# Patient Record
Sex: FEMALE | Race: WHITE | NOT HISPANIC OR LATINO | Employment: FULL TIME | URBAN - NONMETROPOLITAN AREA
[De-identification: names, ages, dates, MRNs, and addresses within clinical notes are randomized per-mention and may not be internally consistent; named-entity substitution may affect disease eponyms.]

---

## 2017-11-14 ENCOUNTER — TRANSCRIBE ORDERS (OUTPATIENT)
Dept: ADMINISTRATIVE | Facility: HOSPITAL | Age: 57
End: 2017-11-14

## 2017-11-14 DIAGNOSIS — R07.89 CHEST PAIN, ATYPICAL: Primary | ICD-10-CM

## 2018-06-11 ENCOUNTER — APPOINTMENT (OUTPATIENT)
Dept: LAB | Facility: HOSPITAL | Age: 58
End: 2018-06-11
Attending: PLASTIC SURGERY

## 2018-06-11 ENCOUNTER — TRANSCRIBE ORDERS (OUTPATIENT)
Dept: ADMINISTRATIVE | Facility: HOSPITAL | Age: 58
End: 2018-06-11

## 2018-06-11 DIAGNOSIS — Z41.1 ENCOUNTER FOR COSMETIC SURGERY: Primary | ICD-10-CM

## 2018-06-11 LAB
ANION GAP SERPL CALCULATED.3IONS-SCNC: 8 MMOL/L (ref 4–13)
BUN BLD-MCNC: 12 MG/DL (ref 5–21)
BUN/CREAT SERPL: 17.1 (ref 7–25)
CALCIUM SPEC-SCNC: 9 MG/DL (ref 8.4–10.4)
CHLORIDE SERPL-SCNC: 101 MMOL/L (ref 98–110)
CO2 SERPL-SCNC: 30 MMOL/L (ref 24–31)
CREAT BLD-MCNC: 0.7 MG/DL (ref 0.5–1.4)
DEPRECATED RDW RBC AUTO: 39.2 FL (ref 40–54)
ERYTHROCYTE [DISTWIDTH] IN BLOOD BY AUTOMATED COUNT: 13 % (ref 12–15)
GFR SERPL CREATININE-BSD FRML MDRD: 86 ML/MIN/1.73
GLUCOSE BLD-MCNC: 116 MG/DL (ref 70–100)
HCT VFR BLD AUTO: 36.1 % (ref 37–47)
HGB BLD-MCNC: 11.9 G/DL (ref 12–16)
MCH RBC QN AUTO: 27.7 PG (ref 28–32)
MCHC RBC AUTO-ENTMCNC: 33 G/DL (ref 33–36)
MCV RBC AUTO: 84 FL (ref 82–98)
PLATELET # BLD AUTO: 181 10*3/MM3 (ref 130–400)
PMV BLD AUTO: 12.2 FL (ref 6–12)
POTASSIUM BLD-SCNC: 4.3 MMOL/L (ref 3.5–5.3)
RBC # BLD AUTO: 4.3 10*6/MM3 (ref 4.2–5.4)
SODIUM BLD-SCNC: 139 MMOL/L (ref 135–145)
WBC NRBC COR # BLD: 7.06 10*3/MM3 (ref 4.8–10.8)

## 2018-06-11 PROCEDURE — 36415 COLL VENOUS BLD VENIPUNCTURE: CPT | Performed by: PLASTIC SURGERY

## 2018-06-11 PROCEDURE — 80048 BASIC METABOLIC PNL TOTAL CA: CPT | Performed by: PLASTIC SURGERY

## 2018-06-11 PROCEDURE — 85027 COMPLETE CBC AUTOMATED: CPT | Performed by: PLASTIC SURGERY

## 2020-03-20 ENCOUNTER — TRANSCRIBE ORDERS (OUTPATIENT)
Dept: ADMINISTRATIVE | Facility: HOSPITAL | Age: 60
End: 2020-03-20

## 2020-03-20 DIAGNOSIS — Z12.31 ENCOUNTER FOR SCREENING MAMMOGRAM FOR MALIGNANT NEOPLASM OF BREAST: Primary | ICD-10-CM

## 2020-07-06 ENCOUNTER — TRANSCRIBE ORDERS (OUTPATIENT)
Dept: ADMINISTRATIVE | Facility: HOSPITAL | Age: 60
End: 2020-07-06

## 2020-07-06 DIAGNOSIS — Z12.31 ENCOUNTER FOR SCREENING MAMMOGRAM FOR MALIGNANT NEOPLASM OF BREAST: Primary | ICD-10-CM

## 2020-07-06 DIAGNOSIS — Z78.0 POSTMENOPAUSAL STATUS: ICD-10-CM

## 2020-07-28 ENCOUNTER — HOSPITAL ENCOUNTER (OUTPATIENT)
Dept: MAMMOGRAPHY | Facility: HOSPITAL | Age: 60
Discharge: HOME | End: 2020-07-28
Admitting: PHYSICIAN ASSISTANT

## 2020-07-28 ENCOUNTER — HOSPITAL ENCOUNTER (OUTPATIENT)
Dept: BONE DENSITY | Facility: HOSPITAL | Age: 60
Discharge: HOME | End: 2020-07-28

## 2020-07-28 DIAGNOSIS — Z78.0 POSTMENOPAUSAL STATUS: ICD-10-CM

## 2020-07-28 DIAGNOSIS — Z12.31 ENCOUNTER FOR SCREENING MAMMOGRAM FOR MALIGNANT NEOPLASM OF BREAST: ICD-10-CM

## 2020-07-28 PROCEDURE — 77063 BREAST TOMOSYNTHESIS BI: CPT

## 2020-07-28 PROCEDURE — 77080 DXA BONE DENSITY AXIAL: CPT

## 2020-07-28 PROCEDURE — 77067 SCR MAMMO BI INCL CAD: CPT

## 2020-11-03 ENCOUNTER — TRANSCRIBE ORDERS (OUTPATIENT)
Dept: ADMINISTRATIVE | Facility: HOSPITAL | Age: 60
End: 2020-11-03

## 2020-11-03 DIAGNOSIS — J32.0 CHRONIC SINUSITIS OF BOTH MAXILLARY SINUSES: Primary | ICD-10-CM

## 2020-11-17 ENCOUNTER — HOSPITAL ENCOUNTER (OUTPATIENT)
Dept: CT IMAGING | Facility: HOSPITAL | Age: 60
Discharge: HOME | End: 2020-11-17
Admitting: FAMILY MEDICINE

## 2020-11-17 DIAGNOSIS — J32.0 CHRONIC SINUSITIS OF BOTH MAXILLARY SINUSES: ICD-10-CM

## 2020-11-17 PROCEDURE — 70486 CT MAXILLOFACIAL W/O DYE: CPT

## 2021-05-18 ENCOUNTER — TRANSCRIBE ORDERS (OUTPATIENT)
Dept: ADMINISTRATIVE | Facility: HOSPITAL | Age: 61
End: 2021-05-18

## 2021-05-18 DIAGNOSIS — Z12.31 ENCOUNTER FOR SCREENING MAMMOGRAM FOR MALIGNANT NEOPLASM OF BREAST: Primary | ICD-10-CM

## 2021-07-29 ENCOUNTER — HOSPITAL ENCOUNTER (OUTPATIENT)
Dept: MAMMOGRAPHY | Facility: HOSPITAL | Age: 61
Discharge: HOME | End: 2021-07-29
Admitting: PHYSICIAN ASSISTANT

## 2021-07-29 DIAGNOSIS — Z12.31 ENCOUNTER FOR SCREENING MAMMOGRAM FOR MALIGNANT NEOPLASM OF BREAST: ICD-10-CM

## 2021-07-29 PROCEDURE — 77067 SCR MAMMO BI INCL CAD: CPT

## 2021-07-29 PROCEDURE — 77063 BREAST TOMOSYNTHESIS BI: CPT

## 2021-10-11 ENCOUNTER — TRANSCRIBE ORDERS (OUTPATIENT)
Dept: ADMINISTRATIVE | Facility: HOSPITAL | Age: 61
End: 2021-10-11

## 2021-10-11 DIAGNOSIS — R00.2 PALPITATIONS: Primary | ICD-10-CM

## 2021-10-13 ENCOUNTER — HOSPITAL ENCOUNTER (OUTPATIENT)
Dept: CARDIOLOGY | Facility: HOSPITAL | Age: 61
Discharge: HOME | End: 2021-10-13
Admitting: PHYSICIAN ASSISTANT

## 2021-10-13 DIAGNOSIS — R00.2 PALPITATIONS: ICD-10-CM

## 2021-10-13 PROCEDURE — 93246 EXT ECG>7D<15D RECORDING: CPT

## 2021-11-05 LAB
MAXIMAL PREDICTED HEART RATE: 159 BPM
STRESS TARGET HR: 135 BPM

## 2021-11-05 PROCEDURE — 93248 EXT ECG>7D<15D REV&INTERPJ: CPT | Performed by: INTERNAL MEDICINE

## 2021-11-22 ENCOUNTER — HOSPITAL ENCOUNTER (OUTPATIENT)
Dept: CARDIOLOGY | Facility: HOSPITAL | Age: 61
Discharge: HOME | End: 2021-11-22
Admitting: PHYSICIAN ASSISTANT

## 2021-11-22 VITALS
DIASTOLIC BLOOD PRESSURE: 70 MMHG | HEIGHT: 66 IN | WEIGHT: 140 LBS | SYSTOLIC BLOOD PRESSURE: 128 MMHG | BODY MASS INDEX: 22.5 KG/M2

## 2021-11-22 DIAGNOSIS — R00.2 PALPITATIONS: ICD-10-CM

## 2021-11-22 LAB
BH CV ECHO MEAS - AO MAX PG (FULL): 1.4 MMHG
BH CV ECHO MEAS - AO MAX PG: 5.3 MMHG
BH CV ECHO MEAS - AO MEAN PG (FULL): 1 MMHG
BH CV ECHO MEAS - AO MEAN PG: 3 MMHG
BH CV ECHO MEAS - AO ROOT AREA (BSA CORRECTED): 1.7
BH CV ECHO MEAS - AO ROOT AREA: 7.1 CM^2
BH CV ECHO MEAS - AO ROOT DIAM: 3 CM
BH CV ECHO MEAS - AO V2 MAX: 115 CM/SEC
BH CV ECHO MEAS - AO V2 MEAN: 78.9 CM/SEC
BH CV ECHO MEAS - AO V2 VTI: 28.5 CM
BH CV ECHO MEAS - AVA(I,A): 2.6 CM^2
BH CV ECHO MEAS - AVA(I,D): 2.6 CM^2
BH CV ECHO MEAS - AVA(V,A): 2.7 CM^2
BH CV ECHO MEAS - AVA(V,D): 2.7 CM^2
BH CV ECHO MEAS - BSA(HAYCOCK): 1.7 M^2
BH CV ECHO MEAS - BSA: 1.7 M^2
BH CV ECHO MEAS - BZI_BMI: 22.6 KILOGRAMS/M^2
BH CV ECHO MEAS - BZI_METRIC_HEIGHT: 167.6 CM
BH CV ECHO MEAS - BZI_METRIC_WEIGHT: 63.5 KG
BH CV ECHO MEAS - EDV(CUBED): 65.9 ML
BH CV ECHO MEAS - EDV(MOD-SP4): 70.6 ML
BH CV ECHO MEAS - EDV(TEICH): 71.7 ML
BH CV ECHO MEAS - EF(CUBED): 67.8 %
BH CV ECHO MEAS - EF(MOD-SP4): 63.3 %
BH CV ECHO MEAS - EF(TEICH): 59.8 %
BH CV ECHO MEAS - ESV(CUBED): 21.3 ML
BH CV ECHO MEAS - ESV(MOD-SP4): 25.9 ML
BH CV ECHO MEAS - ESV(TEICH): 28.8 ML
BH CV ECHO MEAS - FS: 31.4 %
BH CV ECHO MEAS - IVS/LVPW: 0.89
BH CV ECHO MEAS - IVSD: 0.79 CM
BH CV ECHO MEAS - LA DIMENSION: 2.8 CM
BH CV ECHO MEAS - LA/AO: 0.93
BH CV ECHO MEAS - LAT PEAK E' VEL: 10.4 CM/SEC
BH CV ECHO MEAS - LV DIASTOLIC VOL/BSA (35-75): 41.1 ML/M^2
BH CV ECHO MEAS - LV MASS(C)D: 101.5 GRAMS
BH CV ECHO MEAS - LV MASS(C)DI: 59.1 GRAMS/M^2
BH CV ECHO MEAS - LV MAX PG: 3.9 MMHG
BH CV ECHO MEAS - LV MEAN PG: 2 MMHG
BH CV ECHO MEAS - LV SYSTOLIC VOL/BSA (12-30): 15.1 ML/M^2
BH CV ECHO MEAS - LV V1 MAX: 99 CM/SEC
BH CV ECHO MEAS - LV V1 MEAN: 67.3 CM/SEC
BH CV ECHO MEAS - LV V1 VTI: 23.3 CM
BH CV ECHO MEAS - LVIDD: 4 CM
BH CV ECHO MEAS - LVIDS: 2.8 CM
BH CV ECHO MEAS - LVLD AP4: 7 CM
BH CV ECHO MEAS - LVLS AP4: 5.3 CM
BH CV ECHO MEAS - LVOT AREA (M): 3.1 CM^2
BH CV ECHO MEAS - LVOT AREA: 3.1 CM^2
BH CV ECHO MEAS - LVOT DIAM: 2 CM
BH CV ECHO MEAS - LVPWD: 0.89 CM
BH CV ECHO MEAS - MED PEAK E' VEL: 9.79 CM/SEC
BH CV ECHO MEAS - MV A MAX VEL: 51.4 CM/SEC
BH CV ECHO MEAS - MV DEC TIME: 0.34 SEC
BH CV ECHO MEAS - MV E MAX VEL: 82.5 CM/SEC
BH CV ECHO MEAS - MV E/A: 1.6
BH CV ECHO MEAS - PA MAX PG: 1.1 MMHG
BH CV ECHO MEAS - PA V2 MAX: 52.4 CM/SEC
BH CV ECHO MEAS - RAP SYSTOLE: 5 MMHG
BH CV ECHO MEAS - RVSP: 31 MMHG
BH CV ECHO MEAS - SI(AO): 117.2 ML/M^2
BH CV ECHO MEAS - SI(CUBED): 26 ML/M^2
BH CV ECHO MEAS - SI(LVOT): 42.6 ML/M^2
BH CV ECHO MEAS - SI(MOD-SP4): 26 ML/M^2
BH CV ECHO MEAS - SI(TEICH): 25 ML/M^2
BH CV ECHO MEAS - SV(AO): 201.5 ML
BH CV ECHO MEAS - SV(CUBED): 44.7 ML
BH CV ECHO MEAS - SV(LVOT): 73.2 ML
BH CV ECHO MEAS - SV(MOD-SP4): 44.7 ML
BH CV ECHO MEAS - SV(TEICH): 42.9 ML
BH CV ECHO MEAS - TR MAX VEL: 254 CM/SEC
BH CV ECHO MEASUREMENTS AVERAGE E/E' RATIO: 8.17
LEFT ATRIUM VOLUME INDEX: 24.6 ML/M2
LEFT ATRIUM VOLUME: 42.3 CM3
MAXIMAL PREDICTED HEART RATE: 159 BPM
STRESS TARGET HR: 135 BPM

## 2021-11-22 PROCEDURE — 25010000002 PERFLUTREN 6.52 MG/ML SUSPENSION: Performed by: PHYSICIAN ASSISTANT

## 2021-11-22 PROCEDURE — 93306 TTE W/DOPPLER COMPLETE: CPT | Performed by: INTERNAL MEDICINE

## 2021-11-22 PROCEDURE — 93306 TTE W/DOPPLER COMPLETE: CPT

## 2021-11-22 RX ADMIN — PERFLUTREN 9.78 MG: 6.52 INJECTION, SUSPENSION INTRAVENOUS at 07:48

## 2021-11-24 ENCOUNTER — OFFICE VISIT (OUTPATIENT)
Dept: CARDIOLOGY | Facility: CLINIC | Age: 61
End: 2021-11-24

## 2021-11-24 VITALS
HEIGHT: 66 IN | OXYGEN SATURATION: 98 % | WEIGHT: 145 LBS | BODY MASS INDEX: 23.3 KG/M2 | DIASTOLIC BLOOD PRESSURE: 64 MMHG | SYSTOLIC BLOOD PRESSURE: 109 MMHG | HEART RATE: 56 BPM

## 2021-11-24 DIAGNOSIS — I49.3 PVC (PREMATURE VENTRICULAR CONTRACTION): ICD-10-CM

## 2021-11-24 DIAGNOSIS — I47.1 PSVT (PAROXYSMAL SUPRAVENTRICULAR TACHYCARDIA) (HCC): ICD-10-CM

## 2021-11-24 DIAGNOSIS — Z71.89 CARDIAC RISK COUNSELING: ICD-10-CM

## 2021-11-24 DIAGNOSIS — R00.2 PALPITATIONS: Primary | ICD-10-CM

## 2021-11-24 PROBLEM — I47.10 PSVT (PAROXYSMAL SUPRAVENTRICULAR TACHYCARDIA): Status: ACTIVE | Noted: 2021-11-24

## 2021-11-24 PROCEDURE — 99204 OFFICE O/P NEW MOD 45 MIN: CPT | Performed by: INTERNAL MEDICINE

## 2021-11-24 PROCEDURE — 93000 ELECTROCARDIOGRAM COMPLETE: CPT | Performed by: INTERNAL MEDICINE

## 2021-11-24 RX ORDER — LEVOTHYROXINE SODIUM 0.03 MG/1
112 TABLET ORAL
COMMUNITY

## 2021-11-24 RX ORDER — METOPROLOL SUCCINATE 25 MG/1
25 TABLET, EXTENDED RELEASE ORAL DAILY
COMMUNITY
End: 2021-11-24

## 2021-11-24 RX ORDER — OMEPRAZOLE 40 MG/1
CAPSULE, DELAYED RELEASE ORAL
COMMUNITY
Start: 2021-11-15

## 2021-11-24 RX ORDER — UBIDECARENONE 100 MG
100 CAPSULE ORAL DAILY
COMMUNITY

## 2021-11-24 RX ORDER — SIMVASTATIN 20 MG
TABLET ORAL
COMMUNITY
Start: 2021-11-15

## 2021-11-24 RX ORDER — ZOLPIDEM TARTRATE 10 MG/1
10 TABLET ORAL
COMMUNITY

## 2021-11-24 RX ORDER — MULTIVIT WITH MINERALS/LUTEIN
250 TABLET ORAL DAILY
COMMUNITY

## 2021-11-24 RX ORDER — CETIRIZINE HYDROCHLORIDE 10 MG/1
CAPSULE, LIQUID FILLED ORAL EVERY 24 HOURS
COMMUNITY

## 2021-11-24 NOTE — PROGRESS NOTES
[facility] - CARDIOLOGY  New Patient Initial Outpatient Evaluation    Primary Care Physician: Adrian Beckham MD    Subjective     Chief Complaint: Palpitations    History of Present Illness    Ms. Oreilly is a 61-year-old female kindly referred to me by RYAN Chao, for palpitations. Ms. Joya did have her wear a heart monitor, and an echocardiogram with results as noted below.    Ms. Oreilly presents today accompanied by her  who contributes to her history. She reports that she has had intermittent palpitations for most of her adult life. She states that she used to go to Dr. Montanez, but was never given a diagnosis for what it was they were following her for. She adds that as she has gotten older, sometimes the palpitations have gotten a little worse, and in the last 6 months, they have significantly worsened. She adds that after she received her second COVID vaccine, and after she received her booster, she has noticed that she has been experiencing more palpitations. She adds that she received the booster about 1 month ago. She wore the Zio patch after receiving the COVID-19 booster. Additionally, she notes other triggers that would increase her palpitations such as stress, and caffeine. She describes the palpation episodes feeling like she has been jogging, and feels her heart racing, and sometimes her heart skips. Mostly, she just feels the heart racing, and she is not in any pain or swelling. She denies that she is out of breath.     She reports a family history her her father  from a myocardial infarction, and her paternal grandfather  in his 40s.     She states that the symptoms that she is describing to me today were the same that she felt when wearing the Zio patch, and triggering the device 4 times.     Ms. Joya increased her metoprolol 1 week ago after the results of her Zio patch. She denies feeling improved with the increase in metoprolol or more tired or sluggish.    She  states that she has thyroid issues, and she has had trouble with her thyroid intermittently.    Review of Systems   Constitutional: Negative for malaise/fatigue.   Cardiovascular: Positive for palpitations. Negative for chest pain, claudication, dyspnea on exertion, leg swelling, near-syncope, orthopnea, paroxysmal nocturnal dyspnea and syncope.   Respiratory: Negative for shortness of breath.    Hematologic/Lymphatic: Does not bruise/bleed easily.        Otherwise complete ROS reviewed and negative except as mentioned in the HPI.      Past Medical History:   Past Medical History:   Diagnosis Date   • Arrhythmia    • Hyperlipidemia    • Hypertension    • Sleep apnea        Past Surgical History:  Past Surgical History:   Procedure Laterality Date   • AUGMENTATION MAMMAPLASTY Bilateral 2019    replaced, first set was age 29   • HYSTERECTOMY         Family History: family history includes Breast cancer in her maternal aunt; Heart disease in her father.    Social History:  reports that she has never smoked. She has never used smokeless tobacco. She reports current alcohol use. She reports that she does not use drugs.    Medications:  Prior to Admission medications    Medication Sig Start Date End Date Taking? Authorizing Provider   B Complex-C-Biotin-D-Zinc-FA (VITAL-D RX PO) Take  by mouth.   Yes Mateo Hwang MD   Calcium Carbonate-Vit D-Min (CALCIUM 1200 PO) Take  by mouth.   Yes Mateo Hwang MD   Cetirizine HCl (ZyrTEC Allergy) 10 MG capsule Daily.   Yes Mateo Hwang MD   coenzyme Q10 100 MG capsule Take 100 mg by mouth Daily.   Yes Mateo Hwang MD   levothyroxine (SYNTHROID, LEVOTHROID) 25 MCG tablet Take 112 mcg by mouth.   Yes Mateo Hwang MD   Multiple Vitamins-Minerals (ZINC PO) Take  by mouth.   Yes Mateo Hwang MD   omeprazole (priLOSEC) 40 MG capsule  11/15/21  Yes Mateo Hwang MD   simvastatin (ZOCOR) 20 MG tablet  11/15/21  Yes Eri  "MD Mateo   vitamin C (ASCORBIC ACID) 250 MG tablet Take 250 mg by mouth Daily.   Yes Provider, MD Mateo   zolpidem (AMBIEN) 10 MG tablet Take 10 mg by mouth.   Yes Provider, MD Mateo   metoprolol succinate XL (TOPROL-XL) 25 MG 24 hr tablet Take 25 mg by mouth Daily. Takes 1/2 tablet daily  11/24/21 Yes Provider, MD Mateo     Allergies:  No Known Allergies    Objective     Vital Signs: /64   Pulse 56   Ht 167.6 cm (66\")   Wt 65.8 kg (145 lb)   SpO2 98%   BMI 23.40 kg/m²     Vitals and nursing note reviewed.   Constitutional:       General: Not in acute distress.     Appearance: Not in distress.   Neck:      Vascular: No JVD or JVR. JVD normal.   Pulmonary:      Effort: Pulmonary effort is normal.      Breath sounds: Normal breath sounds.   Cardiovascular:      Normal rate. Regular rhythm.      Murmurs: There is no murmur.      No gallop. No rub.   Pulses:     Intact distal pulses.   Skin:     General: Skin is warm and dry.   Neurological:      Mental Status: Alert, oriented to person, place, and time and oriented to person, place and time.         Results Reviewed:      ECG 12 Lead    Date/Time: 11/24/2021 3:14 PM  Performed by: Javan Mancia MD  Authorized by: Javan Mancia MD   Previous ECG: no previous ECG available  Rhythm: sinus bradycardia  BPM: 56  Other findings: low voltage    Clinical impression: normal ECG            Results for orders placed during the hospital encounter of 11/22/21    Adult Transthoracic Echo Complete W/ Cont if Necessary Per Protocol    Interpretation Summary  · Left ventricular ejection fraction appears to be 61 - 65%.  · Left ventricular diastolic function was normal.  · Estimated right ventricular systolic pressure from tricuspid regurgitation is normal (<35 mmHg). Calculated right ventricular systolic pressure from tricuspid regurgitation is 31 mmHg.  · Normal size and function of the right ventricle.  · No significant (greater than mild) " "valvular pathology.      Interpretation Summary of Zio patch 10/13/2021    · A normal monitor study.  · Predominant rhythm was normal sinus rhythm.  · No sustained arrhythmias.  · No symptoms reported.  · Patient treated the device 4 times, but did not report symptoms. Most of these episodes included a single isolated PVC when the device was triggered.  · Several very short, asymptomatic runs of SVT (longest was 16 beats at 104 bpm). Again, these did not correlate with any time in which she triggered the device, and in general, this is a benign finding.         Assessment / Plan        Problem List Items Addressed This Visit        Cardiac and Vasculature    Palpitations - Primary    PSVT (paroxysmal supraventricular tachycardia) (HCC)    PVC (premature ventricular contraction)    Overview     Rare occurrence of isolated PVCs on monitor (Oct '21) correlated with when she triggered the device           Other Visit Diagnoses     Cardiac risk counseling            Recommendations and plans:  The patient had to separate findings on her Zio patch-isolated PVCs, which correlated with when she triggered the device, and other short runs of asymptomatic supraventricular tachycardia (SVT).  Since she was symptomatically aware of the PVCs, daily beta-blocker therapy can suppress these.  However, after our discussion today my reassurance that these are benign findings, particularly in light of her structurally normal heart on echocardiogram, the patient expressed relief and stated she would rather not take the metoprolol if she \"did not have to.\"  Again, given the benign nature of the isolated PVCs (which also occurred only very rarely; <1% of the time), I advised her that she could stop taking the metoprolol that she recently started.    We discussed potential high risk symptoms with sustained arrhythmias, and the patient knows to call us back should she experience anything of the sort.    We also spent a significant portion " "of the visit discussing primary prevention of cardiovascular disease, in light of her family history.  I congratulated her on close PCP follow-up for risk factor stratification and modification, as well as a commitment to a heart healthy diet and a healthy, active lifestyle without smoking.  I also pointed out the fact that she is already taking a statin, which does have preventative value with regards to ischemic cardiovascular events.    Follow-up in 6 months, or sooner with any new or worsening symptoms.  If, at that point, she is not having any prolonged or significantly bothering palpitations, then may only need \"PRN\" follow-up with me thereafter.    Transcribed from ambient dictation for Javan Mancia MD by Mary Barragan.  11/24/21   17:19 CST    Patient verbalized consent to the visit recording.   I Javan Mancia MD have personally performed the services described in this document as scribed by the above individual, and it is both accurate and complete.   I have edited each component as needed.    Javan Mancia MD  11/25/2021  14:53 CST      "

## 2022-07-29 ENCOUNTER — TRANSCRIBE ORDERS (OUTPATIENT)
Dept: ADMINISTRATIVE | Facility: HOSPITAL | Age: 62
End: 2022-07-29

## 2022-07-29 DIAGNOSIS — Z12.31 ENCOUNTER FOR SCREENING MAMMOGRAM FOR MALIGNANT NEOPLASM OF BREAST: Primary | ICD-10-CM

## 2022-07-29 DIAGNOSIS — M81.0 SENILE OSTEOPOROSIS: ICD-10-CM

## 2022-08-09 ENCOUNTER — APPOINTMENT (OUTPATIENT)
Dept: BONE DENSITY | Facility: HOSPITAL | Age: 62
End: 2022-08-09

## 2022-08-09 ENCOUNTER — APPOINTMENT (OUTPATIENT)
Dept: MAMMOGRAPHY | Facility: HOSPITAL | Age: 62
End: 2022-08-09

## 2022-11-15 ENCOUNTER — APPOINTMENT (OUTPATIENT)
Dept: MAMMOGRAPHY | Facility: HOSPITAL | Age: 62
End: 2022-11-15

## 2022-11-15 ENCOUNTER — APPOINTMENT (OUTPATIENT)
Dept: BONE DENSITY | Facility: HOSPITAL | Age: 62
End: 2022-11-15

## 2022-12-06 ENCOUNTER — HOSPITAL ENCOUNTER (OUTPATIENT)
Dept: BONE DENSITY | Facility: HOSPITAL | Age: 62
Discharge: HOME | End: 2022-12-06

## 2022-12-06 ENCOUNTER — HOSPITAL ENCOUNTER (OUTPATIENT)
Dept: MAMMOGRAPHY | Facility: HOSPITAL | Age: 62
Discharge: HOME | End: 2022-12-06

## 2022-12-06 DIAGNOSIS — Z12.31 ENCOUNTER FOR SCREENING MAMMOGRAM FOR MALIGNANT NEOPLASM OF BREAST: ICD-10-CM

## 2022-12-06 DIAGNOSIS — M81.0 SENILE OSTEOPOROSIS: ICD-10-CM

## 2022-12-06 PROCEDURE — 77080 DXA BONE DENSITY AXIAL: CPT

## 2022-12-06 PROCEDURE — 77063 BREAST TOMOSYNTHESIS BI: CPT

## 2022-12-06 PROCEDURE — 77067 SCR MAMMO BI INCL CAD: CPT

## 2023-09-27 ENCOUNTER — TRANSCRIBE ORDERS (OUTPATIENT)
Dept: ADMINISTRATIVE | Facility: HOSPITAL | Age: 63
End: 2023-09-27
Payer: COMMERCIAL

## 2023-09-27 DIAGNOSIS — Z12.31 BREAST CANCER SCREENING BY MAMMOGRAM: Primary | ICD-10-CM

## 2023-12-04 LAB
NCCN CRITERIA FLAG: ABNORMAL
TYRER CUZICK SCORE: 5.5

## 2023-12-15 ENCOUNTER — LAB (OUTPATIENT)
Dept: LAB | Facility: HOSPITAL | Age: 63
End: 2023-12-15
Payer: COMMERCIAL

## 2023-12-15 ENCOUNTER — HOSPITAL ENCOUNTER (OUTPATIENT)
Dept: MAMMOGRAPHY | Facility: HOSPITAL | Age: 63
Discharge: HOME | End: 2023-12-15
Payer: COMMERCIAL

## 2023-12-15 DIAGNOSIS — Z12.31 BREAST CANCER SCREENING BY MAMMOGRAM: ICD-10-CM

## 2023-12-15 DIAGNOSIS — Z13.79 GENETIC TESTING: Primary | ICD-10-CM

## 2023-12-15 DIAGNOSIS — Z13.79 GENETIC TESTING: ICD-10-CM

## 2023-12-15 PROCEDURE — 36415 COLL VENOUS BLD VENIPUNCTURE: CPT

## 2023-12-15 PROCEDURE — 77063 BREAST TOMOSYNTHESIS BI: CPT

## 2023-12-15 PROCEDURE — 77067 SCR MAMMO BI INCL CAD: CPT

## 2023-12-27 LAB
ALLELIC STATE: ABNORMAL
AMBRY INTERPRETATION REPORT: ABNORMAL
AMINO ACID CHANGE: ABNORMAL
CHROMOSOME BLD/T: 16
DNA CHANGE: ABNORMAL
GEN ALLELE LOC ID: ABNORMAL
GENE DIS ANL INTERP-IMP: ABNORMAL
GENE DIS SEQ VAR INTERP-IMP: ABNORMAL
GENE STUDIED ID: ABNORMAL
GENETIC VAR ASSESS: PRESENT
GENOMIC SOURCE CLASS: ABNORMAL
HUMAN REF SEQ ASSEMBLY+BUILD: ABNORMAL
SIMPLE VAR ID: ABNORMAL
SIMPLE VAR ID: ABNORMAL
TRANSCRIPT REF SEQUENCE ID: ABNORMAL
VARIANT CATEGORY: ABNORMAL

## 2023-12-28 ENCOUNTER — TELEPHONE (OUTPATIENT)
Dept: GENETICS | Facility: HOSPITAL | Age: 63
End: 2023-12-28
Payer: COMMERCIAL

## 2023-12-28 NOTE — TELEPHONE ENCOUNTER
I called the patient to inform her that her genetic test results were available, and encourage her to respond to the notification that was sent to her to coordinate an appointment with genetic counseling for a release of those results.  She stated that she was not going to respond to the notification due to a billing issue and that she never wanted the testing done.   I offered my assistance in addressing her billing concerns,  to which she declined.  She said that she did not have time to talk to me regarding this matter.

## 2024-01-02 ENCOUNTER — DOCUMENTATION (OUTPATIENT)
Dept: GENETICS | Facility: HOSPITAL | Age: 64
End: 2024-01-02
Payer: COMMERCIAL

## 2024-01-02 NOTE — PROGRESS NOTES
I called the patient again, to offer assistance regarding a billing matter and disclosure of results.  She stated she would call back when it was more convenient for her.    98.5

## 2024-01-04 ENCOUNTER — TELEPHONE (OUTPATIENT)
Dept: GENETICS | Facility: HOSPITAL | Age: 64
End: 2024-01-04
Payer: COMMERCIAL

## 2024-01-04 ENCOUNTER — DOCUMENTATION (OUTPATIENT)
Dept: GENETICS | Facility: HOSPITAL | Age: 64
End: 2024-01-04
Payer: COMMERCIAL

## 2024-01-04 NOTE — PROGRESS NOTES
On Friday 12/29/23, I called Dr. Beckham's office.  I spoke with Jeny to inform her that the patient had not responded to the release of results from genetic testing.  I also discussed the conversation that I had with Mrs. Oreilly and that she stated she was too busy to talk about this matter.  I told Jeny that I would try to contact Mrs. Oreilly again on Tuesday 1/2/24.    On Wednesday 1/3/24, I called Dr. Beckham's office and spoke with Jeny.  I requested that a provider from that office return my call to discuss this matter.

## 2024-01-05 ENCOUNTER — TELEPHONE (OUTPATIENT)
Dept: GENETICS | Facility: HOSPITAL | Age: 64
End: 2024-01-05
Payer: COMMERCIAL

## 2024-01-05 NOTE — TELEPHONE ENCOUNTER
Jia from the provider's office called to see what actions their office needed to take regarding the genetic test results.  I suggested that they contact her and encourage her to click on the link that was sent by Meilimei to set up an appointment with genetic counseling.  I told her that once that was complete, I would make sure the counseling results were forwarded to their office.

## 2024-06-07 ENCOUNTER — TELEPHONE (OUTPATIENT)
Dept: SURGERY | Age: 64
End: 2024-06-07

## 2024-06-07 NOTE — TELEPHONE ENCOUNTER
Vee requests that [place] return her call. The best time to reach her is Anytime. Vee is returning a call from [facility]. Please contact patient to advise.     Thank you.

## 2024-09-03 ENCOUNTER — HOSPITAL ENCOUNTER (OUTPATIENT)
Dept: WOMENS IMAGING | Age: 64
Discharge: HOME | End: 2024-09-03

## 2024-09-03 DIAGNOSIS — Z12.31 ENCOUNTER FOR SCREENING MAMMOGRAM FOR MALIGNANT NEOPLASM OF BREAST: ICD-10-CM

## 2024-09-30 ENCOUNTER — OFFICE VISIT (OUTPATIENT)
Dept: SURGERY | Age: 64
End: 2024-09-30

## 2024-09-30 VITALS — HEART RATE: 78 BPM | HEIGHT: 66 IN | BODY MASS INDEX: 25.23 KG/M2 | WEIGHT: 157 LBS | OXYGEN SATURATION: 98 %

## 2024-09-30 DIAGNOSIS — Z15.89 PALB2 GENE MUTATION POSITIVE: Primary | ICD-10-CM

## 2024-09-30 RX ORDER — OMEPRAZOLE 40 MG/1
CAPSULE, DELAYED RELEASE ORAL
COMMUNITY
Start: 2021-11-15

## 2024-09-30 NOTE — PROGRESS NOTES
HISTORY OF PRESENT ILLNESS:    Ms. Herrera is a 64 y.o. 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 Test was performed through [facility] on 12/15/2023.  She had gone to [place] and was told she needed an immediate bilateral mastectomy and she freaked out.    She is friends with one of my patients and I talked to her on the phone back then and talked her down off the ledge and she comes in now to discuss this further.    Using [employer], her lifetime breast cancer risk is about 22%.  Her lifetime pancreatic cancer risk is about 5%      She has a family history of breast cancer in her maternal aunt    She is post menopausal and is on HRT.      She has had no prior breast problems    Bilateral Screening Mammogram-12/15/2023 ([facility])  FINDINGS: There are no suspicious findings. Grossly intact bilateral   subglandular silicone implants.     IMPRESSION:   1. No mammographic evidence of malignancy.  Recommend routine annual   screening mammography.   2. Breast density notification will be sent to the patient.   3. Patient meets NCCN guidelines for genetic testing if not previously   performed.     BI-RADS CATEGORY 1: Negative.   Management Recommendation: Routine screening mammography.     This report was signed and finalized on 12/15/2023 12:09 PM by Dr Leny Fabian MD.     BREAST EXAM:    Vee  has unremarkable fibrocystic changes throughout both breasts. There are no dominant masses, no skin or nipple changes and no axillary adenopathy.  I see nothing suspicious on physical examination.      She has bilateral implants with no associated abnormalities.      IMPRESSION: Deleterious mutation of PALB2                           Benign fibrocystic changes                           Low probability of malignancy at present    PLAN: I would recommend close follow-up with alternating mammography and MRI/contrast-enhanced mammography.  We discussed chemoprophylaxis with tamoxifen and she

## 2024-10-06 PROBLEM — Z15.89 PALB2 GENE MUTATION POSITIVE: Status: ACTIVE | Noted: 2024-10-06

## 2024-10-08 DIAGNOSIS — Z12.31 VISIT FOR SCREENING MAMMOGRAM: Primary | ICD-10-CM

## 2024-10-15 ENCOUNTER — TELEPHONE (OUTPATIENT)
Dept: OTHER | Age: 64
End: 2024-10-15

## 2024-12-11 NOTE — PROGRESS NOTES
malignancy at present    PLAN: Follow-up in 6 months with breast MRI  We started her on tamoxifen 5 mg daily    We will also get into her in touch with Elin for referral to the [facility] high risk pancreas program      DISCUSSION:  We discussed  the fibrocystic disease and its relationship to breast cancer  , the pathophysiology of breast cancer, the pathophysiology of fibrocystic disease, and the importance of routine mammograms most of our discussion revolved around her actual breast cancer risk and what her options were.  She had a host of questions and now feels that we have things under control.    I have seen, examined and reviewed this patient medication list, appropriate labs and imaging studies. I reviewed relevant medical records and others physician’s notes. I discussed the plans of care with the patient. I answered all the questions to the patient’s satisfaction.  I, Dr Brandon Yepez, personally performed the services described in this documentation as scribed by Elza Choi MA in my presence and is both accurate and complete.     (Please note that portions of this note were completed with a voice recognition program. Efforts were made to edit the dictations but occasionally words are mis-transcribed.)  Over 50% of the total visit time of 25 minutes in face to face encounter with the patient, out of which more than 50% of the time was spent in counseling patient or family and coordination of care. Counseling included but was not limited to time spent reviewing labs, imaging studies/ treatment plan and answering questions.

## 2024-12-19 ENCOUNTER — HOSPITAL ENCOUNTER (OUTPATIENT)
Dept: WOMENS IMAGING | Age: 64
Discharge: HOME | End: 2024-12-19
Payer: COMMERCIAL

## 2024-12-19 ENCOUNTER — OFFICE VISIT (OUTPATIENT)
Dept: SURGERY | Age: 64
End: 2024-12-19

## 2024-12-19 VITALS — BODY MASS INDEX: 25.07 KG/M2 | HEART RATE: 78 BPM | HEIGHT: 66 IN | OXYGEN SATURATION: 97 % | WEIGHT: 156 LBS

## 2024-12-19 DIAGNOSIS — Z15.89 PALB2 GENE MUTATION POSITIVE: Primary | ICD-10-CM

## 2024-12-19 PROCEDURE — 77063 BREAST TOMOSYNTHESIS BI: CPT

## 2024-12-19 RX ORDER — TAMOXIFEN CITRATE 10 MG/1
5 TABLET ORAL DAILY
Qty: 30 TABLET | Refills: 5 | Status: SHIPPED | OUTPATIENT
Start: 2024-12-19 | End: 2025-12-14

## 2025-01-14 DIAGNOSIS — Z80.3 FAMILY HISTORY OF BREAST CANCER: ICD-10-CM

## 2025-01-14 DIAGNOSIS — Z15.89 PALB2 GENE MUTATION POSITIVE: ICD-10-CM

## 2025-04-30 ENCOUNTER — OFFICE VISIT (OUTPATIENT)
Age: 65
End: 2025-04-30
Payer: MEDICARE

## 2025-04-30 ENCOUNTER — TELEPHONE (OUTPATIENT)
Dept: SURGERY | Facility: CLINIC | Age: 65
End: 2025-04-30
Payer: MEDICARE

## 2025-04-30 VITALS
WEIGHT: 152 LBS | BODY MASS INDEX: 24.43 KG/M2 | DIASTOLIC BLOOD PRESSURE: 80 MMHG | SYSTOLIC BLOOD PRESSURE: 121 MMHG | HEIGHT: 66 IN

## 2025-04-30 DIAGNOSIS — Z98.82 HISTORY OF BREAST AUGMENTATION: ICD-10-CM

## 2025-04-30 DIAGNOSIS — Z15.89 PALB2 GENE MUTATION POSITIVE: Primary | ICD-10-CM

## 2025-04-30 DIAGNOSIS — Z12.31 ENCOUNTER FOR SCREENING MAMMOGRAM FOR MALIGNANT NEOPLASM OF BREAST: ICD-10-CM

## 2025-04-30 DIAGNOSIS — R92.8 OTHER ABNORMAL AND INCONCLUSIVE FINDINGS ON DIAGNOSTIC IMAGING OF BREAST: ICD-10-CM

## 2025-04-30 RX ORDER — ESTRADIOL 1 MG/1
1 TABLET ORAL DAILY
COMMUNITY

## 2025-04-30 NOTE — TELEPHONE ENCOUNTER
Left the patient a voicemail and let them know they left their packet of information in the exam room when they left and it will be up front at the desk for whenever they are able to pick it up.    [facility]  04/30

## 2025-04-30 NOTE — PROGRESS NOTES
Office New Patient History and Physical:     Referring Provider: Referring, Self    Chief complaint: High risk of breast cancer discussion     Subjective .     History of present illness:  Gertrude Oreilly is a 65 y.o. female who presents for high risk screening due to positive genetic testing for a PALB2 mutation. She has a history of bilateral augmentation; last revision with replacement of implants in 2018. She is on Estradiol - oral.     Family history: M aunt with breast cancer at 46, brother with colon cancer in mid 50s.   History of Present Illness  She reports no current concerns related to her breasts, including the absence of nipple discharge or masses. She has one child, born when she was 23 years old. She is currently on oral estradiol therapy but expresses concern about its continued use. She was previously prescribed tamoxifen 10 mg by Dr. Espinoza, but she has not initiated this treatment due to concerns about potential side effects. An MRI was scheduled by Dr. Espinoza, but she received a letter informing her of his departure from the practice. She has never undergone an MRI. She also expresses anxiety about the risk of stroke associated with tamoxifen therapy.    FAMILY HISTORY  Her maternal aunt had breast cancer at 46.  Her brother had colon cancer in his 50s.    MEDICATIONS  Current: Estradiol (oral)  Discontinued: Tamoxifen    History  Past Medical History:   Diagnosis Date    Arrhythmia     Hyperlipidemia     Sleep apnea    ,   Past Surgical History:   Procedure Laterality Date    AUGMENTATION MAMMAPLASTY Bilateral 2019    replaced, first set was age 29    HYSTERECTOMY      OVARIAN CYST REMOVAL     ,   Family History   Problem Relation Age of Onset    Breast cancer Maternal Aunt     Heart disease Father    ,   Social History     Tobacco Use    Smoking status: Never     Passive exposure: Never    Smokeless tobacco: Never   Vaping Use    Vaping status: Never Used   Substance Use Topics    Alcohol use:  "Yes     Comment: occ    Drug use: Never   , (Not in a hospital admission)   and Allergies:  Patient has no known allergies.    Current Outpatient Medications:     B Complex-C-Biotin-D-Zinc-FA (VITAL-D RX PO), Take  by mouth., Disp: , Rfl:     Calcium Carbonate-Vit D-Min (CALCIUM 1200 PO), Take  by mouth., Disp: , Rfl:     Cetirizine HCl (ZyrTEC Allergy) 10 MG capsule, Daily., Disp: , Rfl:     coenzyme Q10 100 MG capsule, Take 1 capsule by mouth Daily., Disp: , Rfl:     estradiol (ESTRACE) 1 MG tablet, Take 1 tablet by mouth Daily., Disp: , Rfl:     levothyroxine (SYNTHROID, LEVOTHROID) 25 MCG tablet, Take 112 mcg by mouth., Disp: , Rfl:     omeprazole (priLOSEC) 40 MG capsule, , Disp: , Rfl:     simvastatin (ZOCOR) 20 MG tablet, , Disp: , Rfl:     vitamin C (ASCORBIC ACID) 250 MG tablet, Take 1 tablet by mouth Daily., Disp: , Rfl:     zolpidem (AMBIEN) 10 MG tablet, Take 1 tablet by mouth., Disp: , Rfl:     Objective     Vital Signs   /80   Ht 167.6 cm (66\")   Wt 68.9 kg (152 lb)   BMI 24.53 kg/m²      Physical Exam:  General appearance - alert, well appearing, and in no distress  Mental status - alert, oriented to person, place, and time  Eyes - pupils equal and reactive, extraocular eye movements intact  Neurological - alert, oriented, normal speech, no focal findings or movement disorder noted  Breast Exam: Bilateral breasts without obvious deformities. Bilateral nipples everted. Implants in place and intact. Patient examined in the supine position with the ipsilateral arm above the head. No palpable masses bilaterally. No nipple discharge bilaterally. No palpable axillary nor supraclavicular adenopathy bilaterally.     Results Review:    The following data was reviewed by: Kamila Salazar MD on 04/30/2025:    Mammo Screening Digital Tomosynthesis Bilateral With CAD (12/19/2024 14:09 EST)   BIRADS 2 benign  SCANNED - LABS (01/05/2024 00:00)   Dr. Espinoza's note from 12/22/24 reviewed in care " everywhere.     Assessment & Plan       Diagnoses and all orders for this visit:    1. PALB2 gene mutation positive (Primary)  -     MRI Breast Bilateral Screening With & Without Contrast; Future  -     Mammo Screening Digital Tomosynthesis Bilateral With CAD; Future    2. History of breast augmentation  -     Mammo Screening Digital Tomosynthesis Bilateral With CAD; Future    3. Other abnormal and inconclusive findings on diagnostic imaging of breast  -     MRI Breast Bilateral Screening With & Without Contrast; Future    4. Encounter for screening mammogram for malignant neoplasm of breast  -     Mammo Screening Digital Tomosynthesis Bilateral With CAD; Future         Assessment & Plan  1. PALB2 mutation.  She has a PALB2 mutation, which increases her lifetime risk of developing breast cancer to 33% to 53%. The implications of this mutation were discussed in detail. She was presented with three potential management strategies: a bilateral mastectomy with reconstruction if desired, discontinuation of hormone therapy and initiation of tamoxifen, or high-risk screening every six months. She was informed that the decision to undergo a bilateral mastectomy is personal and should be made after careful consideration of the risks and benefits. She was advised against concurrent use of hormones and tamoxifen due to the increased risk of stroke. A gradual reduction in oral hormone intake was recommended, transitioning to topical application three times weekly. She was also informed about the option of alternating MRIs and mammograms every six months for early detection of breast cancer. She does not wish to proceed with prophylactic surgery at this time. Orders for an MRI in May 2025 and a mammogram in November 2025 were placed. If she opts for surgery, no further imaging will be required postoperatively, and annual clinical exams will suffice.    Follow-up  The patient will follow up in 7 months.    PROCEDURE  Breast  augmentation with implant replacement was performed in 2018.         BMI is within normal parameters. No other follow-up for BMI required.    Kamila Salazar MD  04/30/25  14:47 CDT    Patient or patient representative verbalized consent for the use of Ambient Listening during the visit with  Kamila Salazar MD for chart documentation. 4/30/2025  19:59 CDT

## 2025-05-01 ENCOUNTER — PATIENT ROUNDING (BHMG ONLY) (OUTPATIENT)
Dept: SURGERY | Facility: CLINIC | Age: 65
End: 2025-05-01
Payer: MEDICARE

## 2025-05-27 ENCOUNTER — HOSPITAL ENCOUNTER (OUTPATIENT)
Dept: MRI IMAGING | Facility: HOSPITAL | Age: 65
Discharge: HOME | End: 2025-05-27
Admitting: STUDENT IN AN ORGANIZED HEALTH CARE EDUCATION/TRAINING PROGRAM
Payer: MEDICARE

## 2025-05-27 DIAGNOSIS — Z15.89 PALB2 GENE MUTATION POSITIVE: ICD-10-CM

## 2025-05-27 DIAGNOSIS — R92.8 OTHER ABNORMAL AND INCONCLUSIVE FINDINGS ON DIAGNOSTIC IMAGING OF BREAST: ICD-10-CM

## 2025-05-27 PROCEDURE — C8937 CAD BREAST MRI: HCPCS

## 2025-05-27 PROCEDURE — A9573 GADOPICLENOL 0.5 MMOL/ML SOLUTION: HCPCS | Performed by: STUDENT IN AN ORGANIZED HEALTH CARE EDUCATION/TRAINING PROGRAM

## 2025-05-27 PROCEDURE — 25510000001 GADOPICLENOL 0.5 MMOL/ML SOLUTION: Performed by: STUDENT IN AN ORGANIZED HEALTH CARE EDUCATION/TRAINING PROGRAM

## 2025-05-27 PROCEDURE — C8908 MRI W/O FOL W/CONT, BREAST,: HCPCS

## 2025-05-27 RX ADMIN — GADOPICLENOL 7 ML: 485.1 INJECTION INTRAVENOUS at 14:38
